# Patient Record
Sex: MALE | Race: BLACK OR AFRICAN AMERICAN
[De-identification: names, ages, dates, MRNs, and addresses within clinical notes are randomized per-mention and may not be internally consistent; named-entity substitution may affect disease eponyms.]

---

## 2019-10-11 ENCOUNTER — HOSPITAL ENCOUNTER (OUTPATIENT)
Dept: HOSPITAL 61 - ENDOS | Age: 64
End: 2019-10-11
Attending: INTERNAL MEDICINE
Payer: MEDICARE

## 2019-10-11 VITALS
DIASTOLIC BLOOD PRESSURE: 86 MMHG | SYSTOLIC BLOOD PRESSURE: 131 MMHG | DIASTOLIC BLOOD PRESSURE: 86 MMHG | SYSTOLIC BLOOD PRESSURE: 131 MMHG | DIASTOLIC BLOOD PRESSURE: 86 MMHG | SYSTOLIC BLOOD PRESSURE: 131 MMHG

## 2019-10-11 DIAGNOSIS — Z79.84: ICD-10-CM

## 2019-10-11 DIAGNOSIS — Z98.890: ICD-10-CM

## 2019-10-11 DIAGNOSIS — K90.49: ICD-10-CM

## 2019-10-11 DIAGNOSIS — K57.30: ICD-10-CM

## 2019-10-11 DIAGNOSIS — Z72.89: ICD-10-CM

## 2019-10-11 DIAGNOSIS — K64.0: ICD-10-CM

## 2019-10-11 DIAGNOSIS — E11.9: ICD-10-CM

## 2019-10-11 DIAGNOSIS — R10.9: Primary | ICD-10-CM

## 2019-10-11 DIAGNOSIS — F41.9: ICD-10-CM

## 2019-10-11 DIAGNOSIS — K21.9: ICD-10-CM

## 2019-10-11 PROCEDURE — 45378 DIAGNOSTIC COLONOSCOPY: CPT

## 2019-10-11 PROCEDURE — 82962 GLUCOSE BLOOD TEST: CPT

## 2020-11-11 ENCOUNTER — HOSPITAL ENCOUNTER (OUTPATIENT)
Dept: HOSPITAL 61 - NM | Age: 65
End: 2020-11-11
Attending: FAMILY MEDICINE
Payer: MEDICARE

## 2020-11-11 DIAGNOSIS — R07.9: Primary | ICD-10-CM

## 2020-11-11 PROCEDURE — 93017 CV STRESS TEST TRACING ONLY: CPT

## 2020-11-11 NOTE — RAD
MR#: [id number]

Account#: [id number]

Accession#: [id number]

Date of Study: 11/11/2020

Ordering Physician: SVETLANA HOUSER 

Referring Physician: KELSEY LOVETT Tech: 





--------------- APPROVED REPORT --------------





Test Type:          Exercise

Stress Nurse/Tech: NEHEMIAH Turner RN

Test Indications: chest pain

Cardiac History: No known cardiac

Medications:     See Electronic Medical Record

Medical History: See Electronic Medical Record

Resting ECG:     SR

Resting Heart Rate: 94 bpm

Resting Blood Pressure: 147/102mmHg

Pretest Chest Pain: None



Nurse/Tech Notes

lungs CTA, S1S2

Consent: The procedure was explained to the patient in lay terms. Informed consent was witnessed. Antonio
eout was entered into [employer]. History and Stress Test performed by Juvencio Espino RT (R) (N)



Stress Symptoms

Dyspnea



POST EXERCISE

Reason for Termination: Reached target heart rate

Target HR: 130

Max HR: 155 bpm

119% of Maximum Predicted HR: 130 bpm

Exercise duration: 6:55 min:sec, 2 Stage

Max Blood Pressure: 189/102mmHg

Blood Pressure response to exercise: Normal blood pressure response during stress.

Heart Rate response to exercise: normal response

Chest Pain: No. 

Arrhythmia: No. 

ST Change: No. 



INTERPRETATION

Stress EKG Conclusion: No evidence of ischemia



Conclusion

1. Normal resting EKG

2. Below average exercise capacity but 7 Mets achieved.

3. Normal stress EKG with stress.

4. Low risk stress overall.



Signed by : Gui Bates, 

Electronically Approved : 11/11/2020 10:17:26

## 2021-03-01 ENCOUNTER — HOSPITAL ENCOUNTER (OUTPATIENT)
Dept: HOSPITAL 61 - LAB | Age: 66
End: 2021-03-01
Attending: INTERNAL MEDICINE
Payer: MEDICARE

## 2021-03-01 DIAGNOSIS — Z01.812: Primary | ICD-10-CM

## 2021-03-01 DIAGNOSIS — K21.9: ICD-10-CM

## 2021-03-01 DIAGNOSIS — R10.13: ICD-10-CM

## 2021-03-01 DIAGNOSIS — Z20.822: ICD-10-CM

## 2021-03-01 PROCEDURE — U0003 INFECTIOUS AGENT DETECTION BY NUCLEIC ACID (DNA OR RNA); SEVERE ACUTE RESPIRATORY SYNDROME CORONAVIRUS 2 (SARS-COV-2) (CORONAVIRUS DISEASE [COVID-19]), AMPLIFIED PROBE TECHNIQUE, MAKING USE OF HIGH THROUGHPUT TECHNOLOGIES AS DESCRIBED BY CMS-2020-01-R: HCPCS

## 2021-03-04 ENCOUNTER — HOSPITAL ENCOUNTER (OUTPATIENT)
Dept: HOSPITAL 61 - ENDOS | Age: 66
Discharge: HOME | End: 2021-03-04
Attending: INTERNAL MEDICINE
Payer: MEDICARE

## 2021-03-04 VITALS
SYSTOLIC BLOOD PRESSURE: 133 MMHG | DIASTOLIC BLOOD PRESSURE: 82 MMHG | DIASTOLIC BLOOD PRESSURE: 82 MMHG | SYSTOLIC BLOOD PRESSURE: 133 MMHG

## 2021-03-04 DIAGNOSIS — Z87.891: ICD-10-CM

## 2021-03-04 DIAGNOSIS — Z72.89: ICD-10-CM

## 2021-03-04 DIAGNOSIS — M19.90: ICD-10-CM

## 2021-03-04 DIAGNOSIS — E11.9: ICD-10-CM

## 2021-03-04 DIAGNOSIS — I10: ICD-10-CM

## 2021-03-04 DIAGNOSIS — G47.30: ICD-10-CM

## 2021-03-04 DIAGNOSIS — Z79.899: ICD-10-CM

## 2021-03-04 DIAGNOSIS — F41.9: ICD-10-CM

## 2021-03-04 DIAGNOSIS — R10.13: Primary | ICD-10-CM

## 2021-03-04 DIAGNOSIS — Z98.890: ICD-10-CM

## 2021-03-04 DIAGNOSIS — F32.9: ICD-10-CM

## 2021-03-04 DIAGNOSIS — K31.89: ICD-10-CM

## 2021-03-04 DIAGNOSIS — K21.00: ICD-10-CM

## 2021-03-04 DIAGNOSIS — Z79.84: ICD-10-CM

## 2021-03-04 PROCEDURE — 43239 EGD BIOPSY SINGLE/MULTIPLE: CPT

## 2021-03-11 ENCOUNTER — HOSPITAL ENCOUNTER (OUTPATIENT)
Dept: HOSPITAL 61 - NM | Age: 66
End: 2021-03-11
Attending: INTERNAL MEDICINE
Payer: MEDICARE

## 2021-03-11 VITALS — HEIGHT: 67 IN | WEIGHT: 232 LBS | BODY MASS INDEX: 36.41 KG/M2

## 2021-03-11 DIAGNOSIS — R10.13: Primary | ICD-10-CM

## 2021-03-11 PROCEDURE — A9537 TC99M MEBROFENIN: HCPCS

## 2021-03-11 PROCEDURE — 78227 HEPATOBIL SYST IMAGE W/DRUG: CPT

## 2021-03-11 RX ADMIN — SINCALIDE ONE MLS/HR: 5 INJECTION, POWDER, LYOPHILIZED, FOR SOLUTION INTRAVENOUS at 13:14

## 2021-03-11 NOTE — RAD
EXAM: Nuclear hepatobiliary scan.



HISTORY: Epigastric pain.



TECHNIQUE: Following intravenous administration of 5.5 mCi Tc 99m Choletec, anterior images of the ab
domen were obtained at five minute intervals through one hour. Subsequently, 2.1 mcg CCK was administ
ered and additional images to assess gallbladder ejection fraction were obtained.



FINDINGS: There is prompt radiotracer uptake by the liver. No focal defect is seen. There is normal e
xcretion into the biliary tree. The gallbladder is visualized within 25 minutes and there is free kiran
w into the duodenum.  The gallbladder ejection fraction is 13 percent.



IMPRESSION: Decreased gallbladder ejection fraction of 13 percent.



Electronically signed by: Ne Brunner MD (3/11/2021 1:31 PM) [id number]